# Patient Record
Sex: FEMALE | Race: WHITE | NOT HISPANIC OR LATINO | Employment: FULL TIME
[De-identification: names, ages, dates, MRNs, and addresses within clinical notes are randomized per-mention and may not be internally consistent; named-entity substitution may affect disease eponyms.]

---

## 2017-04-06 ENCOUNTER — HOSPITAL (OUTPATIENT)
Dept: OTHER | Age: 42
End: 2017-04-06
Attending: FAMILY MEDICINE

## 2017-10-05 ENCOUNTER — HOSPITAL (OUTPATIENT)
Dept: OTHER | Age: 42
End: 2017-10-05
Attending: DERMATOLOGY

## 2017-10-05 LAB
A/G RATIO_: 1.4
ABS LYMPH: 2.2 K/CUMM (ref 1–3.5)
ABS MONO: 0.4 K/CUMM (ref 0.1–0.8)
ABS NEUTRO: 3 K/CUMM (ref 2–8)
ALBUMIN: 4.2 G/DL (ref 3.5–5)
ALK PHOS: 61 UNIT/L (ref 50–124)
ALT/GPT: 18 UNIT/L (ref 0–55)
ANION GAP SERPL CALC-SCNC: 12 MEQ/L (ref 10–20)
AST/GOT: 19 UNIT/L (ref 5–34)
BASOPHIL: 0 % (ref 0–1)
BILI TOTAL: 0.5 MG/DL (ref 0.2–1)
BUN SERPL-MCNC: 14 MG/DL (ref 6–20)
CALCIUM: 9.4 MG/DL (ref 8.4–10.2)
CHLORIDE: 105 MEQ/L (ref 97–107)
CHOLESTEROL: 242 MG/DL
CREATININE: 0.74 MG/DL (ref 0.6–1.3)
DIFF_TYPE?: NORMAL
EOSINOPHIL: 4 % (ref 0–6)
GLOBULIN_: 3.1 G/DL (ref 2–4.1)
GLUCOSE LVL: 85 MG/DL (ref 70–99)
HCT VFR BLD CALC: 42 % (ref 33–45)
HDLC SERPL-MCNC: 65 MG/DL (ref 40–60)
HEMOLYSIS 2+: NEGATIVE
HEMOLYSIS 4+: NEGATIVE
HGB BLD-MCNC: 14.3 G/DL (ref 11–15)
ICTERIC 4+: NEGATIVE
IMMATURE GRAN: 0.3 % (ref 0–0.3)
INSTR WBC: 5.9 K/CUMM (ref 4–11)
LDLC SERPL CALC-MCNC: 166 MG/DL
LIPEMIC 3+: NEGATIVE
LIPEMIC 4+: NEGATIVE
LYMPHOCYTE: 38 %
MCH RBC QN AUTO: 31 PG (ref 25–35)
MCHC RBC AUTO-ENTMCNC: 34 G/DL (ref 32–37)
MCV RBC AUTO: 92 FL (ref 78–97)
MONOCYTE: 6 %
NEUTROPHIL: 51 %
NRBC BLD MANUAL-RTO: 0 % (ref 0–0.2)
PLATELET: 417 K/CUMM (ref 150–450)
POTASSIUM: 4.3 MEQ/L (ref 3.5–5.1)
PT IS FASTING: ABNORMAL
RBC # BLD: 4.63 M/CUMM (ref 3.7–5.2)
RDW: 12.5 % (ref 11.5–14.5)
SER HCG INTERP: NEGATIVE
SERUM HCG: <1 MIU/ML
SODIUM: 140 MEQ/L (ref 136–145)
TCO2: 27 MEQ/L (ref 19–29)
TOTAL PROTEIN: 7.3 G/DL (ref 6.4–8.3)
TRIGL SERPL-MCNC: 57 MG/DL
WBC # BLD: 5.9 K/CUMM (ref 4–11)

## 2017-10-15 ENCOUNTER — HOSPITAL (OUTPATIENT)
Dept: OTHER | Age: 42
End: 2017-10-15
Attending: PHYSICIAN ASSISTANT

## 2017-11-08 ENCOUNTER — HOSPITAL (OUTPATIENT)
Dept: OTHER | Age: 42
End: 2017-11-08
Attending: DERMATOLOGY

## 2017-11-08 LAB
A/G RATIO_: 1.1
ABS LYMPH: 2.1 K/CUMM (ref 1–3.5)
ABS MONO: 0.4 K/CUMM (ref 0.1–0.8)
ABS NEUTRO: 3.7 K/CUMM (ref 2–8)
ALBUMIN: 3.8 G/DL (ref 3.5–5)
ALK PHOS: 76 UNIT/L (ref 50–124)
ALT/GPT: 23 UNIT/L (ref 0–55)
ANION GAP SERPL CALC-SCNC: 11 MEQ/L (ref 10–20)
AST/GOT: 23 UNIT/L (ref 5–34)
BASOPHIL: 0 % (ref 0–1)
BILI TOTAL: 0.5 MG/DL (ref 0.2–1)
BUN SERPL-MCNC: 11 MG/DL (ref 6–20)
CALCIUM: 9.1 MG/DL (ref 8.4–10.2)
CHLORIDE: 105 MEQ/L (ref 97–107)
CHOLESTEROL: 237 MG/DL
CREATININE: 0.67 MG/DL (ref 0.6–1.3)
DIFF_TYPE?: NORMAL
EOSINOPHIL: 6 % (ref 0–6)
GLOBULIN_: 3.5 G/DL (ref 2–4.1)
GLUCOSE LVL: 78 MG/DL (ref 70–99)
HCT VFR BLD CALC: 41 % (ref 33–45)
HDLC SERPL-MCNC: 73 MG/DL (ref 40–60)
HEMOLYSIS 2+: NEGATIVE
HEMOLYSIS 4+: NEGATIVE
HGB BLD-MCNC: 13.6 G/DL (ref 11–15)
ICTERIC 4+: NEGATIVE
IMMATURE GRAN: 0.3 % (ref 0–0.3)
INSTR WBC: 6.6 K/CUMM (ref 4–11)
LDLC SERPL CALC-MCNC: 155 MG/DL
LIPEMIC 3+: NEGATIVE
LIPEMIC 4+: NEGATIVE
LYMPHOCYTE: 31 %
MCH RBC QN AUTO: 31 PG (ref 25–35)
MCHC RBC AUTO-ENTMCNC: 33 G/DL (ref 32–37)
MCV RBC AUTO: 92 FL (ref 78–97)
MONOCYTE: 6 %
NEUTROPHIL: 56 %
NRBC BLD MANUAL-RTO: 0 % (ref 0–0.2)
PLATELET: 404 K/CUMM (ref 150–450)
POTASSIUM: 4.3 MEQ/L (ref 3.5–5.1)
PT IS FASTING: ABNORMAL
RBC # BLD: 4.42 M/CUMM (ref 3.7–5.2)
RDW: 12.5 % (ref 11.5–14.5)
SER HCG INTERP: NEGATIVE
SERUM HCG: <1 MIU/ML
SODIUM: 139 MEQ/L (ref 136–145)
TCO2: 27 MEQ/L (ref 19–29)
TOTAL PROTEIN: 7.3 G/DL (ref 6.4–8.3)
TRIGL SERPL-MCNC: 46 MG/DL
WBC # BLD: 6.6 K/CUMM (ref 4–11)

## 2017-12-06 ENCOUNTER — HOSPITAL (OUTPATIENT)
Dept: OTHER | Age: 42
End: 2017-12-06
Attending: DERMATOLOGY

## 2017-12-06 LAB
A/G RATIO_: 1.2
ABS LYMPH: 2 K/CUMM (ref 1–3.5)
ABS MONO: 0.5 K/CUMM (ref 0.1–0.8)
ABS NEUTRO: 3.5 K/CUMM (ref 2–8)
ALBUMIN: 4.1 G/DL (ref 3.5–5)
ALK PHOS: 76 UNIT/L (ref 50–124)
ALT/GPT: 38 UNIT/L (ref 0–55)
ANION GAP SERPL CALC-SCNC: 12 MEQ/L (ref 10–20)
AST/GOT: 30 UNIT/L (ref 5–34)
BASOPHIL: 1 % (ref 0–1)
BILI TOTAL: 0.5 MG/DL (ref 0.2–1)
BUN SERPL-MCNC: 11 MG/DL (ref 6–20)
CALCIUM: 9.4 MG/DL (ref 8.4–10.2)
CHLORIDE: 104 MEQ/L (ref 97–107)
CHOLESTEROL: 261 MG/DL
CREATININE: 0.69 MG/DL (ref 0.6–1.3)
DIFF_TYPE?: NORMAL
EOSINOPHIL: 8 % (ref 0–6)
GLOBULIN_: 3.4 G/DL (ref 2–4.1)
GLUCOSE LVL: 84 MG/DL (ref 70–99)
HCT VFR BLD CALC: 42 % (ref 33–45)
HDLC SERPL-MCNC: 67 MG/DL (ref 40–60)
HEMOLYSIS 2+: NEGATIVE
HEMOLYSIS 4+: NEGATIVE
HGB BLD-MCNC: 14 G/DL (ref 11–15)
ICTERIC 4+: NEGATIVE
IMMATURE GRAN: 0.3 % (ref 0–0.3)
INSTR WBC: 6.6 K/CUMM (ref 4–11)
LDLC SERPL CALC-MCNC: 182 MG/DL
LIPEMIC 3+: NEGATIVE
LIPEMIC 4+: NEGATIVE
LYMPHOCYTE: 30 %
MCH RBC QN AUTO: 31 PG (ref 25–35)
MCHC RBC AUTO-ENTMCNC: 33 G/DL (ref 32–37)
MCV RBC AUTO: 93 FL (ref 78–97)
MONOCYTE: 7 %
NEUTROPHIL: 54 %
NRBC BLD MANUAL-RTO: 0 % (ref 0–0.2)
PLATELET: 447 K/CUMM (ref 150–450)
POTASSIUM: 4.3 MEQ/L (ref 3.5–5.1)
PT IS FASTING: ABNORMAL
RBC # BLD: 4.52 M/CUMM (ref 3.7–5.2)
RDW: 12.5 % (ref 11.5–14.5)
SER HCG INTERP: NEGATIVE
SERUM HCG: <1 MIU/ML
SODIUM: 139 MEQ/L (ref 136–145)
TCO2: 27 MEQ/L (ref 19–29)
TOTAL PROTEIN: 7.5 G/DL (ref 6.4–8.3)
TRIGL SERPL-MCNC: 59 MG/DL
WBC # BLD: 6.6 K/CUMM (ref 4–11)

## 2018-01-05 ENCOUNTER — HOSPITAL (OUTPATIENT)
Dept: OTHER | Age: 43
End: 2018-01-05
Attending: DERMATOLOGY

## 2018-01-05 LAB
A/G RATIO_: 1.2
ABS LYMPH MAN: 2.1 K/CUMM (ref 1–3.5)
ABS MONO MAN: 0.1 K/CUMM (ref 0.1–0.8)
ABS NEUT MAN: 4.1 K/CUMM (ref 1.8–7.8)
ALBUMIN: 3.9 G/DL (ref 3.5–5)
ALK PHOS: 70 UNIT/L (ref 50–124)
ALT/GPT: 29 UNIT/L (ref 0–55)
ANION GAP SERPL CALC-SCNC: 12 MEQ/L (ref 10–20)
AST/GOT: 30 UNIT/L (ref 5–34)
BASOPHIL MAN: 0 % (ref 0–1)
BILI TOTAL: 0.4 MG/DL (ref 0.2–1)
BUN SERPL-MCNC: 12 MG/DL (ref 6–20)
CALCIUM: 9.2 MG/DL (ref 8.4–10.2)
CHLORIDE: 105 MEQ/L (ref 97–107)
CHOLESTEROL: 230 MG/DL
CREATININE: 0.7 MG/DL (ref 0.6–1.3)
DIFF_TYPE?: ABNORMAL
EOS MAN: 14 % (ref 0–4)
GLOBULIN_: 3.3 G/DL (ref 2–4.1)
GLUCOSE LVL: 78 MG/DL (ref 70–99)
HCT VFR BLD CALC: 43 % (ref 33–45)
HDLC SERPL-MCNC: 52 MG/DL (ref 40–60)
HEMOLYSIS 2+: NEGATIVE
HEMOLYSIS 4+: NEGATIVE
HGB BLD-MCNC: 14.3 G/DL (ref 11–15)
ICTERIC 4+: NEGATIVE
INSTR WBC: 7.4 K/CUMM (ref 4–11)
LDLC SERPL CALC-MCNC: 162 MG/DL
LIPEMIC 3+: NEGATIVE
LIPEMIC 4+: NEGATIVE
LYMPH MAN: 19 %
MCH RBC QN AUTO: 31 PG (ref 25–35)
MCHC RBC AUTO-ENTMCNC: 34 G/DL (ref 32–37)
MCV RBC AUTO: 92 FL (ref 78–97)
MONOCYTE MAN: 2 %
NRBC BLD MANUAL-RTO: 0 % (ref 0–0.2)
PLATELET: 423 K/CUMM (ref 150–450)
PLT ESTIMATE: ADEQUATE
POTASSIUM: 4.3 MEQ/L (ref 3.5–5.1)
PT IS FASTING: ABNORMAL
RBC # BLD: 4.65 M/CUMM (ref 3.7–5.2)
RBC MORPH: NORMAL
RDW: 12.6 % (ref 11.5–14.5)
REACT LYMPH MAN: 9 %
SEGS MAN: 56 %
SER HCG INTERP: NEGATIVE
SERUM HCG: <1 MIU/ML
SODIUM: 139 MEQ/L (ref 136–145)
TCO2: 26 MEQ/L (ref 19–29)
TOTAL LYMPHS MANUAL: 28 %
TOTAL PROTEIN: 7.2 G/DL (ref 6.4–8.3)
TRIGL SERPL-MCNC: 80 MG/DL
WBC # BLD: 7.4 K/CUMM (ref 4–11)

## 2018-08-26 ENCOUNTER — LAB SERVICES (OUTPATIENT)
Dept: OTHER | Age: 43
End: 2018-08-26

## 2018-08-26 ENCOUNTER — CHARTING TRANS (OUTPATIENT)
Dept: OTHER | Age: 43
End: 2018-08-26

## 2018-08-26 LAB
APPEARANCE: NORMAL
BILIRUBIN: NEGATIVE
COLOR: YELLOW
GLUCOSE U: NEGATIVE
KETONES: NEGATIVE
LEUKOCYTES: NEGATIVE
NITRITE: NEGATIVE
OCCULT BLOOD: NORMAL
PH: 7
PROTEIN: NEGATIVE
URINE SPEC GRAVITY: 1.01
UROBILINOGEN: 0.2

## 2018-08-28 LAB — BACTERIA UR CULT: NORMAL

## 2018-08-29 LAB — SOURCE URC: NORMAL

## 2018-09-10 ENCOUNTER — HOSPITAL (OUTPATIENT)
Dept: OTHER | Age: 43
End: 2018-09-10
Attending: PHYSICIAN ASSISTANT

## 2018-09-10 LAB
A/G RATIO_: 1.4
ALBUMIN: 4.3 G/DL (ref 3.5–5)
ALK PHOS: 63 UNIT/L (ref 50–124)
ALT/GPT: 14 UNIT/L (ref 0–55)
ANION GAP SERPL CALC-SCNC: 13 MEQ/L (ref 10–20)
AST/GOT: 18 UNIT/L (ref 5–34)
BILI TOTAL: 0.6 MG/DL (ref 0.2–1)
BUN SERPL-MCNC: 10 MG/DL (ref 6–20)
CALCIUM: 9.6 MG/DL (ref 8.4–10.2)
CHLORIDE: 106 MEQ/L (ref 97–107)
CHOLESTEROL: 211 MG/DL
CREATININE: 0.69 MG/DL (ref 0.6–1.3)
GLOBULIN_: 3.1 G/DL (ref 2–4.1)
GLUCOSE LVL: 81 MG/DL (ref 70–99)
HCG QUANT: <1 MIU/ML
HDLC SERPL-MCNC: 59 MG/DL (ref 40–60)
HEMOLYSIS 2+: NEGATIVE
HEMOLYSIS 4+: NEGATIVE
ICTERIC 4+: NEGATIVE
LDLC SERPL CALC-MCNC: 141 MG/DL
LIPEMIC 3+: NEGATIVE
LIPEMIC 4+: NEGATIVE
POTASSIUM: 4.9 MEQ/L (ref 3.5–5.1)
SODIUM: 140 MEQ/L (ref 136–145)
TCO2: 26 MEQ/L (ref 19–29)
TOTAL PROTEIN: 7.4 G/DL (ref 6.4–8.3)
TRIGL SERPL-MCNC: 55 MG/DL

## 2018-10-11 ENCOUNTER — HOSPITAL (OUTPATIENT)
Dept: OTHER | Age: 43
End: 2018-10-11
Attending: DERMATOLOGY

## 2018-10-11 LAB
A/G RATIO_: 1.2
ABS LYMPH: 2.2 K/CUMM (ref 1–3.5)
ABS MONO: 0.5 K/CUMM (ref 0.1–0.8)
ABS NEUTRO: 3.3 K/CUMM (ref 2–8)
ALBUMIN: 4.1 G/DL (ref 3.5–5)
ALK PHOS: 71 UNIT/L (ref 50–124)
ALT/GPT: 13 UNIT/L (ref 0–55)
ANION GAP SERPL CALC-SCNC: 10 MEQ/L (ref 10–20)
AST/GOT: 16 UNIT/L (ref 5–34)
BASOPHIL: 0 % (ref 0–1)
BILI TOTAL: 0.4 MG/DL (ref 0.2–1)
BUN SERPL-MCNC: 19 MG/DL (ref 6–20)
CALCIUM: 9.6 MG/DL (ref 8.4–10.2)
CHLORIDE: 105 MEQ/L (ref 97–107)
CHOLESTEROL: 238 MG/DL
CREATININE: 0.72 MG/DL (ref 0.6–1.3)
DIFF_TYPE?: NORMAL
EOSINOPHIL: 4 % (ref 0–6)
GLOBULIN_: 3.3 G/DL (ref 2–4.1)
GLUCOSE LVL: 84 MG/DL (ref 70–99)
HCT VFR BLD CALC: 41 % (ref 33–45)
HDLC SERPL-MCNC: 58 MG/DL (ref 40–60)
HEMOLYSIS 2+: NEGATIVE
HEMOLYSIS 4+: NEGATIVE
HGB BLD-MCNC: 13.9 G/DL (ref 11–15)
ICTERIC 4+: NEGATIVE
IMMATURE GRAN: 0.2 % (ref 0–0.3)
INSTR WBC: 6.2 K/CUMM (ref 4–11)
LDLC SERPL CALC-MCNC: 168 MG/DL
LIPEMIC 3+: NEGATIVE
LIPEMIC 4+: NEGATIVE
LYMPHOCYTE: 35 %
MCH RBC QN AUTO: 32 PG (ref 25–35)
MCHC RBC AUTO-ENTMCNC: 34 G/DL (ref 32–37)
MCV RBC AUTO: 93 FL (ref 78–97)
MONOCYTE: 8 %
NEUTROPHIL: 53 %
NRBC BLD MANUAL-RTO: 0 % (ref 0–0.2)
PLATELET: 372 K/CUMM (ref 150–450)
POTASSIUM: 4.2 MEQ/L (ref 3.5–5.1)
PT IS FASTING: ABNORMAL
RBC # BLD: 4.4 M/CUMM (ref 3.7–5.2)
RDW: 11.9 % (ref 11.5–14.5)
SER HCG INTERP: NEGATIVE
SERUM HCG: <1 MIU/ML
SODIUM: 140 MEQ/L (ref 136–145)
TCO2: 29 MEQ/L (ref 19–29)
TOTAL PROTEIN: 7.4 G/DL (ref 6.4–8.3)
TRIGL SERPL-MCNC: 59 MG/DL
WBC # BLD: 6.2 K/CUMM (ref 4–11)

## 2018-11-12 ENCOUNTER — HOSPITAL (OUTPATIENT)
Dept: OTHER | Age: 43
End: 2018-11-12
Attending: PHYSICIAN ASSISTANT

## 2018-11-12 LAB
A/G RATIO_: 1.3
ABS LYMPH: 1.7 K/CUMM (ref 1–3.5)
ABS MONO: 0.4 K/CUMM (ref 0.1–0.8)
ABS NEUTRO: 3 K/CUMM (ref 2–8)
ALBUMIN: 4 G/DL (ref 3.5–5)
ALK PHOS: 65 UNIT/L (ref 50–124)
ALT/GPT: 20 UNIT/L (ref 0–55)
AST/GOT: 21 UNIT/L (ref 5–34)
BASOPHIL: 1 % (ref 0–1)
BILI DIRECT: 0.1 MG/DL (ref 0–0.5)
BILI TOTAL: 0.3 MG/DL (ref 0.2–1)
CHOLESTEROL: 230 MG/DL
DIFF_TYPE?: NORMAL
EOSINOPHIL: 3 % (ref 0–6)
GLOBULIN_: 3.2 G/DL (ref 2–4.1)
HCT VFR BLD CALC: 43 % (ref 33–45)
HDLC SERPL-MCNC: 58 MG/DL (ref 40–60)
HEMOLYSIS 2+: NEGATIVE
HEMOLYSIS 4+: NEGATIVE
HGB BLD-MCNC: 14.3 G/DL (ref 11–15)
ICTERIC 4+: NEGATIVE
IMMATURE GRAN: 0.4 % (ref 0–0.3)
INSTR WBC: 5.3 K/CUMM (ref 4–11)
LDLC SERPL CALC-MCNC: 158 MG/DL
LIPEMIC 2+: NEGATIVE
LIPEMIC 4+: NEGATIVE
LYMPHOCYTE: 32 %
MCH RBC QN AUTO: 32 PG (ref 25–35)
MCHC RBC AUTO-ENTMCNC: 33 G/DL (ref 32–37)
MCV RBC AUTO: 94 FL (ref 78–97)
MONOCYTE: 7 %
NEUTROPHIL: 57 %
NRBC BLD MANUAL-RTO: 0 % (ref 0–0.2)
PLATELET: 409 K/CUMM (ref 150–450)
PT IS FASTING: ABNORMAL
RBC # BLD: 4.54 M/CUMM (ref 3.7–5.2)
RDW: 12.2 % (ref 11.5–14.5)
SER HCG INTERP: NEGATIVE
SERUM HCG: <1 MIU/ML
TOTAL PROTEIN: 7.2 G/DL (ref 6.4–8.3)
TRIGL SERPL-MCNC: 72 MG/DL
WBC # BLD: 5.3 K/CUMM (ref 4–11)

## 2018-12-08 VITALS
OXYGEN SATURATION: 98 % | RESPIRATION RATE: 16 BRPM | HEART RATE: 82 BPM | DIASTOLIC BLOOD PRESSURE: 78 MMHG | SYSTOLIC BLOOD PRESSURE: 108 MMHG | TEMPERATURE: 97.8 F

## 2018-12-11 ENCOUNTER — HOSPITAL (OUTPATIENT)
Dept: OTHER | Age: 43
End: 2018-12-11
Attending: PHYSICIAN ASSISTANT

## 2018-12-11 LAB
A/G RATIO_: 1.3
ABS LYMPH: 2.2 K/CUMM (ref 1–3.5)
ABS MONO: 0.5 K/CUMM (ref 0.1–0.8)
ABS NEUTRO: 3.8 K/CUMM (ref 2–8)
ALBUMIN: 4.2 G/DL (ref 3.5–5)
ALK PHOS: 70 UNIT/L (ref 50–124)
ALT/GPT: 13 UNIT/L (ref 0–55)
AST/GOT: 17 UNIT/L (ref 5–34)
BASOPHIL: 0 % (ref 0–1)
BILI DIRECT: 0.2 MG/DL (ref 0–0.5)
BILI TOTAL: 0.6 MG/DL (ref 0.2–1)
CHOLESTEROL: 240 MG/DL
DIFF_TYPE?: NORMAL
EOSINOPHIL: 1 % (ref 0–6)
GLOBULIN_: 3.3 G/DL (ref 2–4.1)
HCT VFR BLD CALC: 42 % (ref 33–45)
HDLC SERPL-MCNC: 63 MG/DL (ref 40–60)
HEMOLYSIS 2+: NEGATIVE
HEMOLYSIS 4+: NEGATIVE
HGB BLD-MCNC: 14 G/DL (ref 11–15)
ICTERIC 4+: NEGATIVE
IMMATURE GRAN: 0.2 % (ref 0–0.3)
INSTR WBC: 6.6 K/CUMM (ref 4–11)
LDLC SERPL CALC-MCNC: 165 MG/DL
LIPEMIC 2+: NEGATIVE
LIPEMIC 4+: NEGATIVE
LYMPHOCYTE: 34 %
MCH RBC QN AUTO: 30 PG (ref 25–35)
MCHC RBC AUTO-ENTMCNC: 33 G/DL (ref 32–37)
MCV RBC AUTO: 92 FL (ref 78–97)
MONOCYTE: 7 %
NEUTROPHIL: 57 %
NRBC BLD MANUAL-RTO: 0 % (ref 0–0.2)
PLATELET: 420 K/CUMM (ref 150–450)
PT IS FASTING: ABNORMAL
RBC # BLD: 4.59 M/CUMM (ref 3.7–5.2)
RDW: 11.9 % (ref 11.5–14.5)
SER HCG INTERP: NEGATIVE
SERUM HCG: 0 MIU/ML
TOTAL PROTEIN: 7.5 G/DL (ref 6.4–8.3)
TRIGL SERPL-MCNC: 62 MG/DL
WBC # BLD: 6.6 K/CUMM (ref 4–11)

## 2019-01-10 ENCOUNTER — HOSPITAL (OUTPATIENT)
Dept: OTHER | Age: 44
End: 2019-01-10
Attending: PHYSICIAN ASSISTANT

## 2019-01-10 LAB
A/G RATIO_: 1.4
ABS LYMPH: 2.3 K/CUMM (ref 1–3.5)
ABS MONO: 0.6 K/CUMM (ref 0.1–0.8)
ABS NEUTRO: 5 K/CUMM (ref 2–8)
ALBUMIN: 4.6 G/DL (ref 3.5–5)
ALK PHOS: 73 UNIT/L (ref 50–124)
ALT/GPT: 13 UNIT/L (ref 0–55)
AST/GOT: 17 UNIT/L (ref 5–34)
BASOPHIL: 0 % (ref 0–1)
BILI DIRECT: 0.2 MG/DL (ref 0–0.5)
BILI TOTAL: 0.4 MG/DL (ref 0.2–1)
CHOLESTEROL: 250 MG/DL
DIFF_TYPE?: ABNORMAL
EOSINOPHIL: 1 % (ref 0–6)
GLOBULIN_: 3.3 G/DL (ref 2–4.1)
HCT VFR BLD CALC: 46 % (ref 33–45)
HDLC SERPL-MCNC: 69 MG/DL (ref 40–60)
HEMOLYSIS 2+: NEGATIVE
HGB BLD-MCNC: 15.1 G/DL (ref 11–15)
IMMATURE GRAN: 0.2 % (ref 0–0.3)
INSTR WBC: 8.1 K/CUMM (ref 4–11)
LDLC SERPL CALC-MCNC: 169 MG/DL
LIPEMIC 2+: NEGATIVE
LYMPHOCYTE: 29 %
MCH RBC QN AUTO: 31 PG (ref 25–35)
MCHC RBC AUTO-ENTMCNC: 33 G/DL (ref 32–37)
MCV RBC AUTO: 94 FL (ref 78–97)
MONOCYTE: 7 %
NEUTROPHIL: 62 %
NRBC BLD MANUAL-RTO: 0 % (ref 0–0.2)
PLATELET: 403 K/CUMM (ref 150–450)
PT IS FASTING: ABNORMAL
RBC # BLD: 4.87 M/CUMM (ref 3.7–5.2)
RDW: 12.1 % (ref 11.5–14.5)
SER HCG INTERP: NEGATIVE
SERUM HCG: 0 MIU/ML
TOTAL PROTEIN: 7.9 G/DL (ref 6.4–8.3)
TRIGL SERPL-MCNC: 58 MG/DL
WBC # BLD: 8.1 K/CUMM (ref 4–11)

## 2019-02-07 ENCOUNTER — HOSPITAL (OUTPATIENT)
Dept: OTHER | Age: 44
End: 2019-02-07
Attending: PHYSICIAN ASSISTANT

## 2019-02-07 LAB
A/G RATIO_: 1.4
ABS LYMPH: 1.9 K/CUMM (ref 1–3.5)
ABS MONO: 0.4 K/CUMM (ref 0.1–0.8)
ABS NEUTRO: 3.4 K/CUMM (ref 2–8)
ALBUMIN: 4.3 G/DL (ref 3.5–5)
ALK PHOS: 65 UNIT/L (ref 50–124)
ALT/GPT: 12 UNIT/L (ref 0–55)
AST/GOT: 16 UNIT/L (ref 5–34)
BASOPHIL: 0 % (ref 0–1)
BILI DIRECT: 0.2 MG/DL (ref 0–0.5)
BILI TOTAL: 0.5 MG/DL (ref 0.2–1)
CHOLESTEROL: 239 MG/DL
DIFF_TYPE?: NORMAL
EOSINOPHIL: 2 % (ref 0–6)
GLOBULIN_: 3.1 G/DL (ref 2–4.1)
HCT VFR BLD CALC: 42 % (ref 33–45)
HDLC SERPL-MCNC: 59 MG/DL (ref 40–60)
HEMOLYSIS 2+: NEGATIVE
HEMOLYSIS 4+: NEGATIVE
HGB BLD-MCNC: 14.2 G/DL (ref 11–15)
ICTERIC 4+: NEGATIVE
IMMATURE GRAN: 0.2 % (ref 0–0.3)
INSTR WBC: 5.9 K/CUMM (ref 4–11)
LDLC SERPL CALC-MCNC: 166 MG/DL
LIPEMIC 2+: NEGATIVE
LIPEMIC 4+: NEGATIVE
LYMPHOCYTE: 33 %
MCH RBC QN AUTO: 31 PG (ref 25–35)
MCHC RBC AUTO-ENTMCNC: 33 G/DL (ref 32–37)
MCV RBC AUTO: 92 FL (ref 78–97)
MONOCYTE: 7 %
NEUTROPHIL: 57 %
NRBC BLD MANUAL-RTO: 0 % (ref 0–0.2)
PLATELET: 384 K/CUMM (ref 150–450)
RBC # BLD: 4.61 M/CUMM (ref 3.7–5.2)
RDW: 12.4 % (ref 11.5–14.5)
SER HCG INTERP: NEGATIVE
SERUM HCG: 0 MIU/ML
TOTAL PROTEIN: 7.4 G/DL (ref 6.4–8.3)
TRIGL SERPL-MCNC: 71 MG/DL
WBC # BLD: 5.9 K/CUMM (ref 4–11)

## 2019-03-07 ENCOUNTER — HOSPITAL (OUTPATIENT)
Dept: OTHER | Age: 44
End: 2019-03-07
Attending: PHYSICIAN ASSISTANT

## 2019-03-07 LAB
A/G RATIO_: 1.3
ABS LYMPH: 2.3 K/CUMM (ref 1–3.5)
ABS MONO: 0.5 K/CUMM (ref 0.1–0.8)
ABS NEUTRO: 3.4 K/CUMM (ref 2–8)
ALBUMIN: 4.3 G/DL (ref 3.5–5)
ALK PHOS: 83 UNIT/L (ref 50–124)
ALT/GPT: 11 UNIT/L (ref 0–55)
AST/GOT: 16 UNIT/L (ref 5–34)
BASOPHIL: 0 % (ref 0–1)
BILI DIRECT: 0.2 MG/DL (ref 0–0.5)
BILI TOTAL: 0.4 MG/DL (ref 0.2–1)
CHOLESTEROL: 234 MG/DL
DIFF_TYPE?: NORMAL
EOSINOPHIL: 3 % (ref 0–6)
GLOBULIN_: 3.3 G/DL (ref 2–4.1)
HCT VFR BLD CALC: 43 % (ref 33–45)
HDLC SERPL-MCNC: 61 MG/DL (ref 40–60)
HEMOLYSIS 2+: NEGATIVE
HEMOLYSIS 4+: NEGATIVE
HGB BLD-MCNC: 14.3 G/DL (ref 11–15)
ICTERIC 4+: NEGATIVE
IMMATURE GRAN: 0.2 % (ref 0–0.3)
INSTR WBC: 6.5 K/CUMM (ref 4–11)
LDLC SERPL CALC-MCNC: 164 MG/DL
LIPEMIC 2+: NEGATIVE
LIPEMIC 4+: NEGATIVE
LYMPHOCYTE: 36 %
MCH RBC QN AUTO: 31 PG (ref 25–35)
MCHC RBC AUTO-ENTMCNC: 34 G/DL (ref 32–37)
MCV RBC AUTO: 92 FL (ref 78–97)
MONOCYTE: 8 %
NEUTROPHIL: 52 %
NRBC BLD MANUAL-RTO: 0 % (ref 0–0.2)
PLATELET: 396 K/CUMM (ref 150–450)
PT IS FASTING: ABNORMAL
RBC # BLD: 4.62 M/CUMM (ref 3.7–5.2)
RDW: 12.5 % (ref 11.5–14.5)
SER HCG INTERP: NEGATIVE
SERUM HCG: <1 MIU/ML
TOTAL PROTEIN: 7.6 G/DL (ref 6.4–8.3)
TRIGL SERPL-MCNC: 44 MG/DL
WBC # BLD: 6.5 K/CUMM (ref 4–11)

## 2020-10-13 ENCOUNTER — HOSPITAL (OUTPATIENT)
Dept: OTHER | Age: 45
End: 2020-10-13

## 2020-10-14 LAB
A/G RATIO_: 1.3
A/G RATIO_: 1.3
ABS LYMPH: 2 K/CUMM (ref 1–3.5)
ABS LYMPH: 2 K/CUMM (ref 1–3.5)
ABS MONO: 0.5 K/CUMM (ref 0.1–0.8)
ABS MONO: 0.5 K/CUMM (ref 0.1–0.8)
ABS NEUTRO: 3.5 K/CUMM (ref 2–8)
ABS NEUTRO: 3.5 K/CUMM (ref 2–8)
ALBUMIN: 4.4 G/DL (ref 3.5–5)
ALBUMIN: 4.4 G/DL (ref 3.5–5)
ALK PHOS: 74 UNIT/L (ref 50–124)
ALK PHOS: 74 UNIT/L (ref 50–124)
ALT/GPT: 24 UNIT/L (ref 0–55)
ALT/GPT: 24 UNIT/L (ref 0–55)
ANION GAP SERPL CALC-SCNC: 12 MEQ/L (ref 10–20)
ANION GAP SERPL CALC-SCNC: 12 MEQ/L (ref 10–20)
AST/GOT: 23 UNIT/L (ref 5–34)
AST/GOT: 23 UNIT/L (ref 5–34)
BASOPHIL: 0 % (ref 0–1)
BASOPHIL: 0 % (ref 0–1)
BILI TOTAL: 0.5 MG/DL (ref 0.2–1)
BILI TOTAL: 0.5 MG/DL (ref 0.2–1)
BUN SERPL-MCNC: 12 MG/DL (ref 6–20)
BUN SERPL-MCNC: 12 MG/DL (ref 6–20)
CALCIUM: 9.1 MG/DL (ref 8.4–10.2)
CALCIUM: 9.1 MG/DL (ref 8.4–10.2)
CHLORIDE: 105 MEQ/L (ref 97–107)
CHLORIDE: 105 MEQ/L (ref 97–107)
CREATININE: 0.74 MG/DL (ref 0.6–1.3)
CREATININE: 0.74 MG/DL (ref 0.6–1.3)
DIFF_TYPE?: NORMAL
EOSINOPHIL: 5 % (ref 0–6)
EOSINOPHIL: 5 % (ref 0–6)
GLOBULIN_: 3.4 G/DL (ref 2–4.1)
GLOBULIN_: 3.4 G/DL (ref 2–4.1)
GLUCOSE LVL: 88 MG/DL (ref 70–99)
GLUCOSE LVL: 88 MG/DL (ref 70–99)
HCT VFR BLD CALC: 45 % (ref 33–45)
HCT VFR BLD CALC: 45 % (ref 33–45)
HEMOLYSIS 2+: NEGATIVE
HGB BLD-MCNC: 14.9 G/DL (ref 11–15)
HGB BLD-MCNC: 14.9 G/DL (ref 11–15)
IMMATURE GRAN: 0.2 % (ref 0–0.3)
IMMATURE GRAN: 0.2 % (ref 0–0.3)
INSTR WBC: 6.4 K/CUMM (ref 4–11)
LIPEMIC 3+: NEGATIVE
LYMPHOCYTE: 32 %
LYMPHOCYTE: 32 %
MCH RBC QN AUTO: 31 PG (ref 25–35)
MCH RBC QN AUTO: 31 PG (ref 25–35)
MCHC RBC AUTO-ENTMCNC: 33 G/DL (ref 32–37)
MCHC RBC AUTO-ENTMCNC: 33 G/DL (ref 32–37)
MCV RBC AUTO: 94 FL (ref 78–97)
MCV RBC AUTO: 94 FL (ref 78–97)
MONOCYTE: 8 %
MONOCYTE: 8 %
NEUTROPHIL: 55 %
NEUTROPHIL: 55 %
NRBC BLD MANUAL-RTO: 0 % (ref 0–0.2)
NRBC BLD MANUAL-RTO: 0 % (ref 0–0.2)
PLATELET: 414 K/CUMM (ref 150–450)
PLATELET: 414 K/CUMM (ref 150–450)
POTASSIUM: 5.1 MEQ/L (ref 3.5–5.1)
POTASSIUM: 5.1 MEQ/L (ref 3.5–5.1)
RBC # BLD: 4.83 M/CUMM (ref 3.7–5.2)
RBC # BLD: 4.83 M/CUMM (ref 3.7–5.2)
RDW: 12 % (ref 11.5–14.5)
RDW: 12 % (ref 11.5–14.5)
SODIUM: 140 MEQ/L (ref 136–145)
SODIUM: 140 MEQ/L (ref 136–145)
TCO2: 28 MEQ/L (ref 19–29)
TCO2: 28 MEQ/L (ref 19–29)
TOTAL PROTEIN: 7.8 G/DL (ref 6.4–8.3)
TOTAL PROTEIN: 7.8 G/DL (ref 6.4–8.3)
WBC # BLD: 6.4 K/CUMM (ref 4–11)
WBC # BLD: 6.4 K/CUMM (ref 4–11)

## 2020-10-30 ENCOUNTER — HOSPITAL (OUTPATIENT)
Dept: OTHER | Age: 45
End: 2020-10-30

## 2021-05-13 ENCOUNTER — APPOINTMENT (OUTPATIENT)
Dept: GENERAL RADIOLOGY | Age: 46
End: 2021-05-13

## 2021-05-13 ENCOUNTER — HOSPITAL ENCOUNTER (EMERGENCY)
Age: 46
Discharge: HOME | End: 2021-05-13

## 2021-05-13 VITALS
BODY MASS INDEX: 33.46 KG/M2 | SYSTOLIC BLOOD PRESSURE: 127 MMHG | TEMPERATURE: 98.1 F | DIASTOLIC BLOOD PRESSURE: 75 MMHG | OXYGEN SATURATION: 97 % | HEIGHT: 71 IN | WEIGHT: 239 LBS | HEART RATE: 99 BPM | RESPIRATION RATE: 18 BRPM

## 2021-05-13 DIAGNOSIS — S41.112A LACERATION OF LEFT UPPER EXTREMITY, INITIAL ENCOUNTER: Primary | ICD-10-CM

## 2021-05-13 PROCEDURE — 90471 IMMUNIZATION ADMIN: CPT | Performed by: NURSE PRACTITIONER

## 2021-05-13 PROCEDURE — 73090 X-RAY EXAM OF FOREARM: CPT

## 2021-05-13 PROCEDURE — 12002 RPR S/N/AX/GEN/TRNK2.6-7.5CM: CPT

## 2021-05-13 PROCEDURE — 99283 EMERGENCY DEPT VISIT LOW MDM: CPT

## 2021-05-13 PROCEDURE — 90715 TDAP VACCINE 7 YRS/> IM: CPT | Performed by: NURSE PRACTITIONER

## 2021-05-13 PROCEDURE — 12001 RPR S/N/AX/GEN/TRNK 2.5CM/<: CPT | Performed by: NURSE PRACTITIONER

## 2021-05-13 PROCEDURE — 10002800 HB RX 250 W HCPCS: Performed by: NURSE PRACTITIONER

## 2021-05-13 PROCEDURE — 10002803 HB RX 637: Performed by: NURSE PRACTITIONER

## 2021-05-13 RX ORDER — CEPHALEXIN 250 MG/1
500 CAPSULE ORAL ONCE
Status: COMPLETED | OUTPATIENT
Start: 2021-05-13 | End: 2021-05-13

## 2021-05-13 RX ORDER — LIDOCAINE HYDROCHLORIDE 10 MG/ML
20 INJECTION, SOLUTION EPIDURAL; INFILTRATION; INTRACAUDAL; PERINEURAL ONCE
Status: DISCONTINUED | OUTPATIENT
Start: 2021-05-13 | End: 2021-05-13 | Stop reason: HOSPADM

## 2021-05-13 RX ORDER — CEPHALEXIN 500 MG/1
500 CAPSULE ORAL 4 TIMES DAILY
Qty: 28 CAPSULE | Refills: 0 | Status: SHIPPED | OUTPATIENT
Start: 2021-05-13 | End: 2021-05-20

## 2021-05-13 RX ORDER — LIDOCAINE HYDROCHLORIDE 10 MG/ML
INJECTION, SOLUTION EPIDURAL; INFILTRATION; INTRACAUDAL; PERINEURAL
Status: DISCONTINUED
Start: 2021-05-13 | End: 2021-05-13 | Stop reason: HOSPADM

## 2021-05-13 RX ADMIN — CEPHALEXIN 500 MG: 250 CAPSULE ORAL at 19:57

## 2021-05-13 RX ADMIN — TETANUS TOXOID, REDUCED DIPHTHERIA TOXOID AND ACELLULAR PERTUSSIS VACCINE, ADSORBED 0.5 ML: 5; 2.5; 8; 8; 2.5 SUSPENSION INTRAMUSCULAR at 17:21

## 2021-05-13 SDOH — HEALTH STABILITY: MENTAL HEALTH: HOW OFTEN DO YOU HAVE A DRINK CONTAINING ALCOHOL?: NEVER

## 2021-05-13 ASSESSMENT — ENCOUNTER SYMPTOMS
FEVER: 0
VOMITING: 0
COUGH: 0
BACK PAIN: 0
EYE REDNESS: 0
APPETITE CHANGE: 0
COLOR CHANGE: 0
HEADACHES: 0
EYE ITCHING: 0
DIARRHEA: 0
SORE THROAT: 0
SHORTNESS OF BREATH: 0
RHINORRHEA: 0
TROUBLE SWALLOWING: 0
EYE DISCHARGE: 0
DIZZINESS: 0
FATIGUE: 0
CHEST TIGHTNESS: 0
CHILLS: 0
ACTIVITY CHANGE: 0
WOUND: 1
ABDOMINAL PAIN: 0
EYE PAIN: 0

## 2021-06-05 ENCOUNTER — TELEPHONE (OUTPATIENT)
Dept: SCHEDULING | Age: 46
End: 2021-06-05

## 2021-06-05 DIAGNOSIS — I99.8 ISCHEMIA: Primary | ICD-10-CM

## 2022-04-28 ENCOUNTER — APPOINTMENT (OUTPATIENT)
Dept: MAMMOGRAPHY | Age: 47
End: 2022-04-28

## 2022-05-25 ENCOUNTER — APPOINTMENT (OUTPATIENT)
Dept: URBAN - METROPOLITAN AREA CLINIC 246 | Age: 47
Setting detail: DERMATOLOGY
End: 2022-05-25

## 2022-05-25 DIAGNOSIS — Z41.9 ENCOUNTER FOR PROCEDURE FOR PURPOSES OTHER THAN REMEDYING HEALTH STATE, UNSPECIFIED: ICD-10-CM

## 2022-05-25 PROCEDURE — OTHER JUVEDERM ULTRA XC INJECTION: OTHER

## 2022-07-11 ENCOUNTER — RX ONLY (RX ONLY)
Age: 47
End: 2022-07-11

## 2022-07-11 RX ORDER — DOXYCYCLINE HYCLATE 100 MG/1
CAPSULE, GELATIN COATED ORAL
Qty: 30 | Refills: 1 | Status: ERX | COMMUNITY
Start: 2022-07-11

## 2022-08-12 ENCOUNTER — APPOINTMENT (OUTPATIENT)
Dept: URBAN - METROPOLITAN AREA CLINIC 246 | Age: 47
Setting detail: DERMATOLOGY
End: 2022-08-16

## 2022-08-12 DIAGNOSIS — Z41.9 ENCOUNTER FOR PROCEDURE FOR PURPOSES OTHER THAN REMEDYING HEALTH STATE, UNSPECIFIED: ICD-10-CM

## 2022-08-12 PROCEDURE — OTHER JUVEDERM ULTRA XC INJECTION: OTHER

## 2022-08-12 PROCEDURE — OTHER BOTOX: OTHER

## 2022-08-12 PROCEDURE — OTHER ADDITIONAL NOTES: OTHER

## 2022-08-12 NOTE — PROCEDURE: JUVEDERM ULTRA XC INJECTION
Lateral Face Filler  Volume In Cc: 0
Map Statment: See Attach Map for Complete Details
Anesthesia Type: 1% lidocaine with epinephrine
Anesthesia Volume In Cc: 0.5
Additional Anesthesia Volume In Cc: 6
Procedural Text: The filler was administered to the treatment areas noted above.
Post-Care Instructions: Patient instructed to apply ice to reduce swelling.
Detail Level: Detailed
Price (Use Numbers Only, No Special Characters Or $): 0.00
Consent: Written consent obtained. Risks include but not limited to bruising, beading, irregular texture, ulceration, infection, allergic reaction, scar formation, incomplete augmentation, temporary nature, procedural pain.
Use Map Statement For Sites (Optional): No
Filler: Juvederm Ultra XC

## 2022-09-21 ENCOUNTER — APPOINTMENT (OUTPATIENT)
Dept: URBAN - METROPOLITAN AREA CLINIC 246 | Age: 47
Setting detail: DERMATOLOGY
End: 2022-09-21

## 2022-09-21 DIAGNOSIS — Z41.9 ENCOUNTER FOR PROCEDURE FOR PURPOSES OTHER THAN REMEDYING HEALTH STATE, UNSPECIFIED: ICD-10-CM

## 2022-09-21 PROCEDURE — OTHER JUVEDERM ULTRA XC INJECTION: OTHER

## 2022-09-21 PROCEDURE — OTHER HYALURONIDASE INJECTION: OTHER

## 2022-09-21 ASSESSMENT — LOCATION DETAILED DESCRIPTION DERM
LOCATION DETAILED: LEFT UPPER CUTANEOUS LIP
LOCATION DETAILED: RIGHT NASOLABIAL FOLD

## 2022-09-21 ASSESSMENT — LOCATION SIMPLE DESCRIPTION DERM
LOCATION SIMPLE: RIGHT LIP
LOCATION SIMPLE: LEFT LIP

## 2022-09-21 ASSESSMENT — LOCATION ZONE DERM: LOCATION ZONE: LIP

## 2022-10-26 ENCOUNTER — APPOINTMENT (OUTPATIENT)
Dept: URBAN - METROPOLITAN AREA CLINIC 246 | Age: 47
Setting detail: DERMATOLOGY
End: 2022-10-26

## 2022-10-26 DIAGNOSIS — Z41.9 ENCOUNTER FOR PROCEDURE FOR PURPOSES OTHER THAN REMEDYING HEALTH STATE, UNSPECIFIED: ICD-10-CM

## 2022-10-26 PROCEDURE — OTHER ADDITIONAL NOTES: OTHER

## 2022-10-26 PROCEDURE — OTHER JUVEDERM ULTRA XC INJECTION: OTHER

## 2022-10-26 NOTE — PROCEDURE: ADDITIONAL NOTES
Detail Level: Simple
Additional Notes: 1.5 cc used on right upper lip and 0.5 in the left. 2ccs in the lower lip. Will consider Hylenex in the left cutaneous upper lip if patient wants more symmetry.
Render Risk Assessment In Note?: no

## 2022-11-30 ENCOUNTER — APPOINTMENT (OUTPATIENT)
Dept: URBAN - METROPOLITAN AREA CLINIC 246 | Age: 47
Setting detail: DERMATOLOGY
End: 2022-12-01

## 2022-11-30 DIAGNOSIS — Z41.9 ENCOUNTER FOR PROCEDURE FOR PURPOSES OTHER THAN REMEDYING HEALTH STATE, UNSPECIFIED: ICD-10-CM

## 2022-11-30 PROCEDURE — OTHER FILLERS: OTHER

## 2022-11-30 PROCEDURE — OTHER BOTOX: OTHER

## 2023-02-08 ENCOUNTER — APPOINTMENT (OUTPATIENT)
Dept: URBAN - METROPOLITAN AREA CLINIC 240 | Age: 48
Setting detail: DERMATOLOGY
End: 2023-02-08

## 2023-02-08 DIAGNOSIS — L70.0 ACNE VULGARIS: ICD-10-CM

## 2023-02-08 DIAGNOSIS — Z41.9 ENCOUNTER FOR PROCEDURE FOR PURPOSES OTHER THAN REMEDYING HEALTH STATE, UNSPECIFIED: ICD-10-CM

## 2023-02-08 PROCEDURE — OTHER JUVEDERM ULTRA XC INJECTION: OTHER

## 2023-02-08 PROCEDURE — OTHER PRESCRIPTION: OTHER

## 2023-02-08 PROCEDURE — OTHER PRESCRIPTION MEDICATION MANAGEMENT: OTHER

## 2023-02-08 PROCEDURE — OTHER COUNSELING: OTHER

## 2023-02-08 PROCEDURE — 99213 OFFICE O/P EST LOW 20 MIN: CPT

## 2023-02-08 PROCEDURE — OTHER BOTOX: OTHER

## 2023-02-08 RX ORDER — SPIRONOLACTONE 50 MG/1
TABLET, FILM COATED ORAL
Qty: 60 | Refills: 6 | Status: ERX | COMMUNITY
Start: 2023-02-08

## 2023-02-08 ASSESSMENT — LOCATION SIMPLE DESCRIPTION DERM
LOCATION SIMPLE: RIGHT CHEEK
LOCATION SIMPLE: LEFT CHEEK

## 2023-02-08 ASSESSMENT — LOCATION ZONE DERM: LOCATION ZONE: FACE

## 2023-02-08 ASSESSMENT — LOCATION DETAILED DESCRIPTION DERM
LOCATION DETAILED: RIGHT INFERIOR CENTRAL MALAR CHEEK
LOCATION DETAILED: LEFT INFERIOR CENTRAL MALAR CHEEK

## 2023-02-08 NOTE — PROCEDURE: BOTOX
Price (Use Numbers Only, No Special Characters Or $): 965 Price (Use Numbers Only, No Special Characters Or $): 489

## 2023-02-08 NOTE — PROCEDURE: COUNSELING
Birth Control Pills Pregnancy And Lactation Text: This medication should be avoided if pregnant and for the first 30 days post-partum.
Doxycycline Pregnancy And Lactation Text: This medication is Pregnancy Category D and not consider safe during pregnancy. It is also excreted in breast milk but is considered safe for shorter treatment courses.
Minocycline Counseling: Patient advised regarding possible photosensitivity and discoloration of the teeth, skin, lips, tongue and gums.  Patient instructed to avoid sunlight, if possible.  When exposed to sunlight, patients should wear protective clothing, sunglasses, and sunscreen.  The patient was instructed to call the office immediately if the following severe adverse effects occur:  hearing changes, easy bruising/bleeding, severe headache, or vision changes.  The patient verbalized understanding of the proper use and possible adverse effects of minocycline.  All of the patient's questions and concerns were addressed.
Bactrim Pregnancy And Lactation Text: This medication is Pregnancy Category D and is known to cause fetal risk.  It is also excreted in breast milk.
Benzoyl Peroxide Pregnancy And Lactation Text: This medication is Pregnancy Category C. It is unknown if benzoyl peroxide is excreted in breast milk.
Isotretinoin Counseling: Patient should get monthly blood tests, not donate blood, not drive at night if vision affected, not share medication, and not undergo elective surgery for 6 months after tx completed. Side effects reviewed, pt to contact office should one occur.
Erythromycin Pregnancy And Lactation Text: This medication is Pregnancy Category B and is considered safe during pregnancy. It is also excreted in breast milk.
Birth Control Pills Counseling: Birth Control Pill Counseling: I discussed with the patient the potential side effects of OCPs including but not limited to increased risk of stroke, heart attack, thrombophlebitis, deep venous thrombosis, hepatic adenomas, breast changes, GI upset, headaches, and depression.  The patient verbalized understanding of the proper use and possible adverse effects of OCPs. All of the patient's questions and concerns were addressed.
Topical Retinoid Pregnancy And Lactation Text: This medication is Pregnancy Category C. It is unknown if this medication is excreted in breast milk.
Aklief Pregnancy And Lactation Text: It is unknown if this medication is safe to use during pregnancy.  It is unknown if this medication is excreted in breast milk.  Breastfeeding women should use the topical cream on the smallest area of the skin for the shortest time needed while breastfeeding.  Do not apply to nipple and areola.
Doxycycline Counseling:  Patient counseled regarding possible photosensitivity and increased risk for sunburn.  Patient instructed to avoid sunlight, if possible.  When exposed to sunlight, patients should wear protective clothing, sunglasses, and sunscreen.  The patient was instructed to call the office immediately if the following severe adverse effects occur:  hearing changes, easy bruising/bleeding, severe headache, or vision changes.  The patient verbalized understanding of the proper use and possible adverse effects of doxycycline.  All of the patient's questions and concerns were addressed.
Isotretinoin Pregnancy And Lactation Text: This medication is Pregnancy Category X and is considered extremely dangerous during pregnancy. It is unknown if it is excreted in breast milk.
Dapsone Counseling: I discussed with the patient the risks of dapsone including but not limited to hemolytic anemia, agranulocytosis, rashes, methemoglobinemia, kidney failure, peripheral neuropathy, headaches, GI upset, and liver toxicity.  Patients who start dapsone require monitoring including baseline LFTs and weekly CBCs for the first month, then every month thereafter.  The patient verbalized understanding of the proper use and possible adverse effects of dapsone.  All of the patient's questions and concerns were addressed.
Topical Clindamycin Counseling: Patient counseled that this medication may cause skin irritation or allergic reactions.  In the event of skin irritation, the patient was advised to reduce the amount of the drug applied or use it less frequently.   The patient verbalized understanding of the proper use and possible adverse effects of clindamycin.  All of the patient's questions and concerns were addressed.
Azithromycin Counseling:  I discussed with the patient the risks of azithromycin including but not limited to GI upset, allergic reaction, drug rash, diarrhea, and yeast infections.
Tazorac Pregnancy And Lactation Text: This medication is not safe during pregnancy. It is unknown if this medication is excreted in breast milk.
Tetracycline Counseling: Patient counseled regarding possible photosensitivity and increased risk for sunburn.  Patient instructed to avoid sunlight, if possible.  When exposed to sunlight, patients should wear protective clothing, sunglasses, and sunscreen.  The patient was instructed to call the office immediately if the following severe adverse effects occur:  hearing changes, easy bruising/bleeding, severe headache, or vision changes.  The patient verbalized understanding of the proper use and possible adverse effects of tetracycline.  All of the patient's questions and concerns were addressed. Patient understands to avoid pregnancy while on therapy due to potential birth defects.
Bactrim Counseling:  I discussed with the patient the risks of sulfa antibiotics including but not limited to GI upset, allergic reaction, drug rash, diarrhea, dizziness, photosensitivity, and yeast infections.  Rarely, more serious reactions can occur including but not limited to aplastic anemia, agranulocytosis, methemoglobinemia, blood dyscrasias, liver or kidney failure, lung infiltrates or desquamative/blistering drug rashes.
Winlevi Pregnancy And Lactation Text: This medication is considered safe during pregnancy and breastfeeding.
Benzoyl Peroxide Counseling: Patient counseled that medicine may cause skin irritation and bleach clothing.  In the event of skin irritation, the patient was advised to reduce the amount of the drug applied or use it less frequently.   The patient verbalized understanding of the proper use and possible adverse effects of benzoyl peroxide.  All of the patient's questions and concerns were addressed.
Erythromycin Counseling:  I discussed with the patient the risks of erythromycin including but not limited to GI upset, allergic reaction, drug rash, diarrhea, increase in liver enzymes, and yeast infections.
Detail Level: Zone
Include Pregnancy/Lactation Warning?: No
Topical Sulfur Applications Pregnancy And Lactation Text: This medication is Pregnancy Category C and has an unknown safety profile during pregnancy. It is unknown if this topical medication is excreted in breast milk.
Topical Sulfur Applications Counseling: Topical Sulfur Counseling: Patient counseled that this medication may cause skin irritation or allergic reactions.  In the event of skin irritation, the patient was advised to reduce the amount of the drug applied or use it less frequently.   The patient verbalized understanding of the proper use and possible adverse effects of topical sulfur application.  All of the patient's questions and concerns were addressed.
High Dose Vitamin A Pregnancy And Lactation Text: High dose vitamin A therapy is contraindicated during pregnancy and breast feeding.
Azithromycin Pregnancy And Lactation Text: This medication is considered safe during pregnancy and is also secreted in breast milk.
Azelaic Acid Counseling: Patient counseled that medicine may cause skin irritation and to avoid applying near the eyes.  In the event of skin irritation, the patient was advised to reduce the amount of the drug applied or use it less frequently.   The patient verbalized understanding of the proper use and possible adverse effects of azelaic acid.  All of the patient's questions and concerns were addressed.
Sarecycline Counseling: Patient advised regarding possible photosensitivity and discoloration of the teeth, skin, lips, tongue and gums.  Patient instructed to avoid sunlight, if possible.  When exposed to sunlight, patients should wear protective clothing, sunglasses, and sunscreen.  The patient was instructed to call the office immediately if the following severe adverse effects occur:  hearing changes, easy bruising/bleeding, severe headache, or vision changes.  The patient verbalized understanding of the proper use and possible adverse effects of sarecycline.  All of the patient's questions and concerns were addressed.
Topical Retinoid counseling:  Patient advised to apply a pea-sized amount only at bedtime and wait 30 minutes after washing their face before applying.  If too drying, patient may add a non-comedogenic moisturizer. The patient verbalized understanding of the proper use and possible adverse effects of retinoids.  All of the patient's questions and concerns were addressed.
Dapsone Pregnancy And Lactation Text: This medication is Pregnancy Category C and is not considered safe during pregnancy or breast feeding.
Topical Clindamycin Pregnancy And Lactation Text: This medication is Pregnancy Category B and is considered safe during pregnancy. It is unknown if it is excreted in breast milk.
Spironolactone Pregnancy And Lactation Text: This medication can cause feminization of the male fetus and should be avoided during pregnancy. The active metabolite is also found in breast milk.
Azelaic Acid Pregnancy And Lactation Text: This medication is considered safe during pregnancy and breast feeding.
Spironolactone Counseling: Patient advised regarding risks of diarrhea, abdominal pain, hyperkalemia, birth defects (for female patients), liver toxicity and renal toxicity. The patient may need blood work to monitor liver and kidney function and potassium levels while on therapy. The patient verbalized understanding of the proper use and possible adverse effects of spironolactone.  All of the patient's questions and concerns were addressed.
Tazorac Counseling:  Patient advised that medication is irritating and drying.  Patient may need to apply sparingly and wash off after an hour before eventually leaving it on overnight.  The patient verbalized understanding of the proper use and possible adverse effects of tazorac.  All of the patient's questions and concerns were addressed.
High Dose Vitamin A Counseling: Side effects reviewed, pt to contact office should one occur.
Minocycline Pregnancy And Lactation Text: This medication is Pregnancy Category D and not consider safe during pregnancy. It is also excreted in breast milk.
Aklief counseling:  Patient advised to apply a pea-sized amount only at bedtime and wait 30 minutes after washing their face before applying.  If too drying, patient may add a non-comedogenic moisturizer.  The most commonly reported side effects including irritation, redness, scaling, dryness, stinging, burning, itching, and increased risk of sunburn.  The patient verbalized understanding of the proper use and possible adverse effects of retinoids.  All of the patient's questions and concerns were addressed.
Winlevi Counseling:  I discussed with the patient the risks of topical clascoterone including but not limited to erythema, scaling, itching, and stinging. Patient voiced their understanding.

## 2023-02-08 NOTE — PROCEDURE: BOTOX
Comments: 1:1 glabella, ZAHRAA, brow lift, Platysmal bands, masseters, nasalis, upper/lower lip, chin, jelly roll, Levator labii superioris alaeque nasi (LLSN)\\n2:1 forehead, crows

## 2023-04-07 DIAGNOSIS — Z12.31 ENCOUNTER FOR SCREENING MAMMOGRAM FOR MALIGNANT NEOPLASM OF BREAST: Primary | ICD-10-CM

## 2023-05-09 ENCOUNTER — APPOINTMENT (OUTPATIENT)
Dept: URBAN - METROPOLITAN AREA CLINIC 240 | Age: 48
Setting detail: DERMATOLOGY
End: 2023-05-09

## 2023-05-09 DIAGNOSIS — Z41.9 ENCOUNTER FOR PROCEDURE FOR PURPOSES OTHER THAN REMEDYING HEALTH STATE, UNSPECIFIED: ICD-10-CM

## 2023-05-09 PROCEDURE — OTHER BOTOX: OTHER

## 2023-05-09 NOTE — PROCEDURE: BOTOX
Price (Use Numbers Only, No Special Characters Or $): 992 Price (Use Numbers Only, No Special Characters Or $): 921

## 2023-05-24 ENCOUNTER — APPOINTMENT (OUTPATIENT)
Dept: URBAN - METROPOLITAN AREA CLINIC 240 | Age: 48
Setting detail: DERMATOLOGY
End: 2023-05-25

## 2023-05-24 DIAGNOSIS — Z41.9 ENCOUNTER FOR PROCEDURE FOR PURPOSES OTHER THAN REMEDYING HEALTH STATE, UNSPECIFIED: ICD-10-CM

## 2023-05-24 PROCEDURE — OTHER HYALURONIDASE INJECTION: OTHER

## 2023-05-24 PROCEDURE — OTHER BOTOX: OTHER

## 2023-05-24 ASSESSMENT — LOCATION ZONE DERM
LOCATION ZONE: LIP
LOCATION ZONE: FACE

## 2023-05-24 ASSESSMENT — LOCATION SIMPLE DESCRIPTION DERM
LOCATION SIMPLE: RIGHT LIP
LOCATION SIMPLE: RIGHT FOREHEAD
LOCATION SIMPLE: LEFT LIP

## 2023-05-24 ASSESSMENT — LOCATION DETAILED DESCRIPTION DERM
LOCATION DETAILED: RIGHT SUPERIOR VERMILION LIP
LOCATION DETAILED: RIGHT INFERIOR LATERAL FOREHEAD
LOCATION DETAILED: LEFT SUPERIOR VERMILION LIP

## 2023-07-27 ENCOUNTER — APPOINTMENT (OUTPATIENT)
Dept: URBAN - METROPOLITAN AREA CLINIC 240 | Age: 48
Setting detail: DERMATOLOGY
End: 2023-07-27

## 2023-07-27 DIAGNOSIS — Z41.9 ENCOUNTER FOR PROCEDURE FOR PURPOSES OTHER THAN REMEDYING HEALTH STATE, UNSPECIFIED: ICD-10-CM

## 2023-07-27 PROCEDURE — OTHER JUVEDERM VOLBELLA INJECTION: OTHER

## 2023-07-27 ASSESSMENT — LOCATION SIMPLE DESCRIPTION DERM: LOCATION SIMPLE: RIGHT LIP

## 2023-07-27 ASSESSMENT — LOCATION DETAILED DESCRIPTION DERM: LOCATION DETAILED: RIGHT SUPERIOR VERMILION LIP

## 2023-07-27 ASSESSMENT — LOCATION ZONE DERM: LOCATION ZONE: LIP

## 2023-07-27 NOTE — PROCEDURE: JUVEDERM VOLBELLA INJECTION
Brows Filler  Volume In Cc: 0
Map Statment: See Attach Map for Complete Details
Procedural Text: The filler was administered to the treatment areas noted above.
Anesthesia Volume In Cc: 0.5
Detail Level: Detailed
Filler: Juvederm Volbella XC
Include Cannula Information In Note?: No
Anesthesia Type: 1% lidocaine with epinephrine
Consent: Written consent obtained. Risks include but not limited to bruising, beading, irregular texture, ulceration, infection, allergic reaction, scar formation, incomplete augmentation, temporary nature, procedural pain.
Additional Anesthesia Volume In Cc: 6
Post-Care Instructions: Patient instructed to apply ice to reduce swelling.
Show Inventory Tab: Show
Number Of Syringes (Required For Inventory): 1

## 2023-09-19 ENCOUNTER — APPOINTMENT (OUTPATIENT)
Dept: MAMMOGRAPHY | Age: 48
End: 2023-09-19
Attending: OBSTETRICS & GYNECOLOGY

## 2023-11-07 ENCOUNTER — APPOINTMENT (OUTPATIENT)
Dept: URBAN - METROPOLITAN AREA CLINIC 240 | Age: 48
Setting detail: DERMATOLOGY
End: 2023-11-08

## 2023-11-07 DIAGNOSIS — Z41.9 ENCOUNTER FOR PROCEDURE FOR PURPOSES OTHER THAN REMEDYING HEALTH STATE, UNSPECIFIED: ICD-10-CM

## 2023-11-07 PROCEDURE — OTHER BOTOX: OTHER

## 2023-11-07 PROCEDURE — OTHER DIAGNOSIS COMMENT: OTHER

## 2023-11-07 NOTE — PROCEDURE: DIAGNOSIS COMMENT
Comment: October special per [facility] $10 per unit. $590 for 59 units.
Detail Level: Simple
Render Risk Assessment In Note?: no

## 2023-11-07 NOTE — PROCEDURE: BOTOX
Show Depressor Anguli Units: Yes
Mentalis Units: 0
Show Right And Left Brow Units: No
Periorbital Skin Units: 15
Nasal Root Units: 6
Incrementing Botox Units: By 0.5 Units
Detail Level: Detailed
Consent: Written consent obtained. Risks include but not limited to lid/brow ptosis, bruising, swelling, diplopia, temporary effect, incomplete chemical denervation.
Post-Care Instructions: Patient instructed to not lie down for 4 hours and limit physical activity for 24 hours.
Forehead Units: 10
Glabellar Complex Units: 20
Show Inventory Tab: Show
Comments: 1:1 glabella, ZAHRAA, brow lift, Platysmal bands, masseters, nasalis, upper/lower lip, chin, jelly roll, Levator labii\\nsuperioris alaeque nasi (LLSN)\\n2:1 forehead, crows
Depressor Anguli Oris Units: 8

## 2023-11-14 ENCOUNTER — HOSPITAL ENCOUNTER (OUTPATIENT)
Dept: MAMMOGRAPHY | Age: 48
Discharge: HOME | End: 2023-11-14
Attending: OBSTETRICS & GYNECOLOGY

## 2023-11-14 DIAGNOSIS — Z12.31 ENCOUNTER FOR SCREENING MAMMOGRAM FOR MALIGNANT NEOPLASM OF BREAST: ICD-10-CM

## 2023-11-14 PROCEDURE — 77063 BREAST TOMOSYNTHESIS BI: CPT
